# Patient Record
Sex: MALE | Race: WHITE | Employment: PART TIME | URBAN - METROPOLITAN AREA
[De-identification: names, ages, dates, MRNs, and addresses within clinical notes are randomized per-mention and may not be internally consistent; named-entity substitution may affect disease eponyms.]

---

## 2017-05-17 ENCOUNTER — HOSPITAL ENCOUNTER (EMERGENCY)
Age: 22
Discharge: HOME | End: 2017-05-17
Attending: EMERGENCY MEDICINE

## 2017-05-17 ENCOUNTER — APPOINTMENT (OUTPATIENT)
Dept: GENERAL RADIOLOGY | Age: 22
End: 2017-05-17

## 2017-05-17 VITALS
WEIGHT: 150 LBS | HEART RATE: 110 BPM | HEIGHT: 69 IN | OXYGEN SATURATION: 97 % | BODY MASS INDEX: 22.22 KG/M2 | DIASTOLIC BLOOD PRESSURE: 82 MMHG | SYSTOLIC BLOOD PRESSURE: 133 MMHG | TEMPERATURE: 98.7 F | RESPIRATION RATE: 18 BRPM

## 2017-05-17 DIAGNOSIS — F32.A DEPRESSION, UNSPECIFIED DEPRESSION TYPE: ICD-10-CM

## 2017-05-17 DIAGNOSIS — R05.9 COUGH: ICD-10-CM

## 2017-05-17 DIAGNOSIS — F41.1 ANXIETY STATE: Primary | ICD-10-CM

## 2017-05-17 DIAGNOSIS — J02.9 ACUTE PHARYNGITIS, UNSPECIFIED ETIOLOGY: ICD-10-CM

## 2017-05-17 PROCEDURE — 99284 EMERGENCY DEPT VISIT MOD MDM: CPT

## 2017-05-17 PROCEDURE — 71020 XR CHEST STANDARD TWO VW: CPT

## 2017-05-17 PROCEDURE — 6370000000 HC RX 637 (ALT 250 FOR IP): Performed by: EMERGENCY MEDICINE

## 2017-05-17 RX ORDER — BENZONATATE 100 MG/1
100 CAPSULE ORAL ONCE
Status: COMPLETED | OUTPATIENT
Start: 2017-05-17 | End: 2017-05-17

## 2017-05-17 RX ORDER — LORAZEPAM 1 MG/1
1 TABLET ORAL ONCE
Status: COMPLETED | OUTPATIENT
Start: 2017-05-17 | End: 2017-05-17

## 2017-05-17 RX ORDER — ALBUTEROL SULFATE 90 UG/1
1-2 AEROSOL, METERED RESPIRATORY (INHALATION) EVERY 4 HOURS PRN
Qty: 1 INHALER | Refills: 0 | Status: SHIPPED | OUTPATIENT
Start: 2017-05-17 | End: 2020-03-22 | Stop reason: ALTCHOICE

## 2017-05-17 RX ORDER — BENZONATATE 100 MG/1
100 CAPSULE ORAL 3 TIMES DAILY PRN
Qty: 20 CAPSULE | Refills: 0 | Status: SHIPPED | OUTPATIENT
Start: 2017-05-17 | End: 2021-03-12

## 2017-05-17 RX ADMIN — BENZONATATE 100 MG: 100 CAPSULE ORAL at 21:54

## 2017-05-17 RX ADMIN — Medication 1 LOZENGE: at 21:54

## 2017-05-17 RX ADMIN — LORAZEPAM 1 MG: 1 TABLET ORAL at 21:55

## 2017-05-17 ASSESSMENT — ENCOUNTER SYMPTOMS
BLOOD IN STOOL: 0
SORE THROAT: 1
CONSTIPATION: 0
BACK PAIN: 0
COUGH: 1
NAUSEA: 0
DIARRHEA: 0
ABDOMINAL PAIN: 0
VOMITING: 0
TROUBLE SWALLOWING: 0
RHINORRHEA: 0
SINUS PRESSURE: 0
PHOTOPHOBIA: 0
SHORTNESS OF BREATH: 0

## 2017-05-17 ASSESSMENT — PAIN SCALES - GENERAL: PAINLEVEL_OUTOF10: 0

## 2018-06-15 ENCOUNTER — HOSPITAL ENCOUNTER (EMERGENCY)
Age: 23
Discharge: HOME | End: 2018-06-15
Attending: EMERGENCY MEDICINE

## 2018-06-15 VITALS
BODY MASS INDEX: 21.47 KG/M2 | OXYGEN SATURATION: 98 % | RESPIRATION RATE: 16 BRPM | SYSTOLIC BLOOD PRESSURE: 124 MMHG | WEIGHT: 150 LBS | TEMPERATURE: 97.4 F | HEART RATE: 116 BPM | HEIGHT: 70 IN | DIASTOLIC BLOOD PRESSURE: 82 MMHG

## 2018-06-15 DIAGNOSIS — F32.A DEPRESSION, UNSPECIFIED DEPRESSION TYPE: Primary | ICD-10-CM

## 2018-06-15 PROCEDURE — 99283 EMERGENCY DEPT VISIT LOW MDM: CPT

## 2020-03-22 ENCOUNTER — HOSPITAL ENCOUNTER (EMERGENCY)
Age: 25
Discharge: HOME | End: 2020-03-23
Attending: EMERGENCY MEDICINE
Payer: COMMERCIAL

## 2020-03-22 VITALS
HEART RATE: 105 BPM | HEIGHT: 70 IN | OXYGEN SATURATION: 99 % | WEIGHT: 155 LBS | RESPIRATION RATE: 16 BRPM | SYSTOLIC BLOOD PRESSURE: 119 MMHG | DIASTOLIC BLOOD PRESSURE: 71 MMHG | TEMPERATURE: 98.3 F | BODY MASS INDEX: 22.19 KG/M2

## 2020-03-22 PROCEDURE — 99284 EMERGENCY DEPT VISIT MOD MDM: CPT

## 2020-03-22 RX ORDER — ALBUTEROL SULFATE 90 UG/1
2 AEROSOL, METERED RESPIRATORY (INHALATION) EVERY 6 HOURS PRN
Qty: 1 INHALER | Refills: 3 | Status: SHIPPED | OUTPATIENT
Start: 2020-03-22

## 2020-03-22 RX ORDER — PREDNISONE 20 MG/1
60 TABLET ORAL DAILY
Qty: 15 TABLET | Refills: 0 | Status: SHIPPED | OUTPATIENT
Start: 2020-03-22 | End: 2020-03-27

## 2020-03-22 RX ORDER — DOXYCYCLINE HYCLATE 100 MG
100 TABLET ORAL 2 TIMES DAILY
Qty: 20 TABLET | Refills: 0 | Status: SHIPPED | OUTPATIENT
Start: 2020-03-22 | End: 2020-04-01

## 2020-03-23 NOTE — ED PROVIDER NOTES
EMERGENCY DEPARTMENT ENCOUNTER    Pt Name: Kayla Mari  MRN: [id number]  [place] 1995  Date of evaluation: 3/22/20  CHIEF COMPLAINT       Chief Complaint   Patient presents with    Asthma     HISTORY OF PRESENT ILLNESS   HPI    HISTORY OF PRESENT ILLNESS:  Past medical history of anxiety and asthma presents for chief complaint of asthma exacerbation cough. Patient has had mild asthma exacerbation for the past couple days. No fevers or chills. No runny nose or sore throat. Has had mild nonproductive cough. Patient states this is consistent with his asthma exacerbation. Severity is moderate. No aggravating or relieving factors. Timing is couple days. Course is constant.   Context is asthma  -----------------------  -----------------------  REVIEW OF SYSTEMS  ED Caveat: [none]  Gen:  No fever, no chills  CV: No CP, no palpitations  Resp: +SOB, no respiratory distress  GI: No V/D, no abd pain  : No dysuria, no increased frequency  Skin: No rash, no purulent lesions  Eyes: No blurry vision, No double vision  MSK: No back pain, no joint pain  Neuro: No HA, no sensation changes  Psych: No SI/HI  -----------------------  -----------------------  ALLERGIES  -per nursing records, reviewed    PAST MEDICAL HISTORY  -See HPI    SOCIAL HISTORY  -No daily drinking, no IV drugs  -----------------------  -----------------------  PHYSICAL EXAM  Gen: Alert, no acute distress  Skin: Warm, no rashes  Head: Normocephalic, atraumatic  Neck: No midline tenderness, no nuchal rigidity  Eye: EOMI, PERRLA, normal conjunctiva  ENT: Mucous membranes moist, no pharyngeal erythema  CV: Mildly tachycardic, no rubs  Resp: Respirations unlabored, mild diffuse wheezing  GI: Soft, non distended, no large abdominal masses, non tender  MSK: No midline back pain, no large joint effusions  Neuro: Alert and oriented, no focal neurological deficits observed  Psych: Cooperative, appropriate mood and affect  -----------------------  -----------------------  MEDICAL DECISION MAKING  Differential Diagnosis:  - Consideration is given for   bronchospasm, pulmonary edema, pneumonia, pneumothorax, pericardial effusion, PE, ACS,  -  #Impression/Plan:  - Clinically patient's presentation is most consistent with anxiety and asthma exacerbation. Extensive discussion with patient he wants no further testing just a work note, albuterol refills, steroids and antibiotics if anything were to worsen. I had extensive discussion with him regarding this and did offer further testing. Patient declines at this time. Clinically is very well-appearing at this time in no acute distress.  -I did strongly consider coronavirus. However patient has no fever and is clinically well-appearing at this time. Does have a mild diffuse wheeze and a history of asthma and symptoms are consistent with his asthma. Patient did not want any further testing and would like discharge home.      -----------------------  -----------------------  Michelle Palmer MD, Heather Iniguez [id number]  Emergency Medicine Attending  Questions? Please contact my cell phone anytime. [phone]  *This charting supersedes any ED resident or staff charting and was written using speech recognition software      PASTMEDICAL HISTORY     Past Medical History:   Diagnosis Date    Anxiety     Asthma     Depression      SURGICAL HISTORY     No past surgical history on file. CURRENT MEDICATIONS       Previous Medications    ALBUTEROL SULFATE HFA (PROVENTIL HFA) 108 (90 BASE) MCG/ACT INHALER    Inhale 1-2 puffs into the lungs every 4 hours as needed for Wheezing or Shortness of Breath (Space out to every 6 hours as symptoms improve) Space out to every 6 hours as symptoms improve.     BENZOCAINE-MENTHOL (CEPACOL SORE THROAT) 15-3.6 MG LOZENGE    Take 1 lozenge by mouth 3 times daily as needed for Sore Throat    BENZONATATE (TESSALON) 100 MG CAPSULE    Take 1 capsule by mouth 3 times daily as

## 2020-03-23 NOTE — ED NOTES
Mode of arrival (squad #, walk in, police, etc) : walk in from home        Chief complaint(s): asthma        Arrival Note (brief scenario, treatment PTA, etc). : Pt presented to the ED c/o of shortness of breath X 3 days with a productive cough. Pt states he has a Hx of asthma. Pt states sometimes his cough produces a clear sputum. Pt states he is suppose to take his inhaler daily but is not compliant with it. Pt states he took his inhaler today and it helped with his breathing. Pt alert and oriented, PWD, PMS intact, lung sounds clear. C= \"Have you ever felt that you should Cut down on your drinking? \"  No  A= \"Have people Annoyed you by criticizing your drinking? \"  No  G= \"Have you ever felt bad or Guilty about your drinking? \"  No  E= \"Have you ever had a drink as an Eye-opener first thing in the morning to steady your nerves or to help a hangover? \"  No      Deferred []      Reason for deferring: N/A    *If yes to two or more: probable alcohol abuse. Jennifer Hale RN  03/22/20 0317

## 2021-03-06 ENCOUNTER — APPOINTMENT (OUTPATIENT)
Dept: GENERAL RADIOLOGY | Age: 26
End: 2021-03-06
Payer: COMMERCIAL

## 2021-03-06 ENCOUNTER — HOSPITAL ENCOUNTER (EMERGENCY)
Age: 26
Discharge: HOME | End: 2021-03-06
Attending: EMERGENCY MEDICINE
Payer: COMMERCIAL

## 2021-03-06 VITALS
RESPIRATION RATE: 11 BRPM | OXYGEN SATURATION: 99 % | DIASTOLIC BLOOD PRESSURE: 67 MMHG | SYSTOLIC BLOOD PRESSURE: 114 MMHG | TEMPERATURE: 98.3 F | HEIGHT: 69 IN | BODY MASS INDEX: 22.22 KG/M2 | HEART RATE: 70 BPM | WEIGHT: 150 LBS

## 2021-03-06 DIAGNOSIS — R00.2 PALPITATIONS: Primary | ICD-10-CM

## 2021-03-06 PROCEDURE — 71045 X-RAY EXAM CHEST 1 VIEW: CPT

## 2021-03-06 PROCEDURE — 6370000000 HC RX 637 (ALT 250 FOR IP): Performed by: GENERAL PRACTICE

## 2021-03-06 PROCEDURE — 99285 EMERGENCY DEPT VISIT HI MDM: CPT

## 2021-03-06 PROCEDURE — 93005 ELECTROCARDIOGRAM TRACING: CPT | Performed by: GENERAL PRACTICE

## 2021-03-06 RX ADMIN — Medication 1 LOZENGE: at 16:04

## 2021-03-06 ASSESSMENT — ENCOUNTER SYMPTOMS
SHORTNESS OF BREATH: 1
ABDOMINAL PAIN: 0
SORE THROAT: 1
VOMITING: 1
TROUBLE SWALLOWING: 0
NAUSEA: 0
VOICE CHANGE: 0
CONSTIPATION: 0

## 2021-03-06 NOTE — ED PROVIDER NOTES
[address]  Emergency Department Encounter  EmergencyMedicine Resident     Pt Neel Niño III  MRN: [id number]  [place] 1995  Date of evaluation: 3/6/21  PCP:  No primary care provider on file. CHIEF COMPLAINT       Chief Complaint   Patient presents with    Nausea    Chills    Palpitations       HISTORY OF PRESENT ILLNESS  (Location/Symptom, Timing/Onset, Context/Setting, Quality, Duration, Modifying Factors, Severity.)      Lukas Liriano III is a 30 y.o. male who presents with nausea palpitations and chills. Patient states that he recently quit smoking a pack and half a day and started using nicotine vapes. Patient states he has been using it quite a bit. Patient states he had one other episode when he used it where he felt his heart was racing. Patient states this morning he had \"hit it quite a bit\" and began to feel his heart race and had 2 bouts of nonbloody emesis. Patient states he called poison control and he had every symptom that they mentioned. Patient has a history of anxiety asthma and depression. PAST MEDICAL / SURGICAL / SOCIAL / FAMILY HISTORY      has a past medical history of Anxiety, Asthma, and Depression. has no past surgical history on file.     Social History     Socioeconomic History    Marital status: Single     Spouse name: Not on file    Number of children: Not on file    Years of education: Not on file    Highest education level: Not on file   Occupational History    Not on file   Social Needs    Financial resource strain: Not on file    Food insecurity     Worry: Not on file     Inability: Not on file    Transportation needs     Medical: Not on file     Non-medical: Not on file   Tobacco Use    Smoking status: Former Smoker     Packs/day: 1.50     Years: 4.00     Pack years: 6.00     Types: E-Cigarettes, Cigarettes     Quit date: 2018     Years since quittin.8    Smokeless tobacco: Never Used    Tobacco comment: pt currently using vapor   Substance and Sexual Activity    Alcohol use: Yes     Comment: social ETOH use    Drug use: Yes     Types: Marijuana     Comment: drug abuse includes cannabis    Sexual activity: Not on file   Lifestyle    Physical activity     Days per week: Not on file     Minutes per session: Not on file    Stress: Not on file   Relationships    Social connections     Talks on phone: Not on file     Gets together: Not on file     Attends Confucianist service: Not on file     Active member of club or organization: Not on file     Attends meetings of clubs or organizations: Not on file     Relationship status: Not on file    Intimate partner violence     Fear of current or ex partner: Not on file     Emotionally abused: Not on file     Physically abused: Not on file     Forced sexual activity: Not on file   Other Topics Concern    Not on file   Social History Narrative    Not on file       History reviewed. No pertinent family history. Allergies:  Patient has no known allergies. Home Medications:  Prior to Admission medications    Medication Sig Start Date End Date Taking? Authorizing Provider   albuterol sulfate HFA (VENTOLIN HFA) 108 (90 Base) MCG/ACT inhaler Inhale 2 puffs into the lungs every 6 hours as needed for Wheezing 3/22/20   Sawyer Delgado MD   benzocaine-menthol (CEPACOL SORE THROAT) 15-3.6 MG lozenge Take 1 lozenge by mouth 3 times daily as needed for Sore Throat 5/17/17   Sheba Reyes DO   benzonatate (TESSALON) 100 MG capsule Take 1 capsule by mouth 3 times daily as needed for Cough 5/17/17   Sylvester Hameed, DO       REVIEW OF SYSTEMS    (2-9 systems for level 4, 10 or more for level 5)      Review of Systems   Constitutional: Positive for activity change and chills. Negative for appetite change and fever. HENT: Positive for sore throat. Negative for trouble swallowing and voice change. Respiratory: Positive for shortness of breath.     Cardiovascular: Negative for chest pain. Gastrointestinal: Positive for vomiting. Negative for abdominal pain, constipation and nausea. Genitourinary: Negative for dysuria and hematuria. Skin: Negative for rash and wound. Neurological: Negative for seizures and headaches. Psychiatric/Behavioral: Negative for confusion. The patient is nervous/anxious. PHYSICAL EXAM   (up to 7 for level 4, 8 or more for level 5)      INITIAL VITALS:   /78   Pulse 79   Temp 98.3 °F (36.8 °C) (Oral)   Resp 18   Ht 5' 9\" (1.753 m)   Wt 150 lb (68 kg)   SpO2 99%   BMI 22.15 kg/m²     Physical Exam  Constitutional:       General: He is not in acute distress. Appearance: He is not ill-appearing, toxic-appearing or diaphoretic. Comments: Patient is sitting comfortably in stretcher, there is no evidence of cholinergic syndrome to include increased oral secretions, sweating, fasciculations   HENT:      Head: Normocephalic and atraumatic. Cardiovascular:      Rate and Rhythm: Normal rate and regular rhythm. Pulses: Normal pulses. Heart sounds: Normal heart sounds. Pulmonary:      Effort: Pulmonary effort is normal.      Breath sounds: Normal breath sounds. Abdominal:      General: Abdomen is flat. Palpations: Abdomen is soft. Neurological:      General: No focal deficit present. Mental Status: He is alert and oriented to person, place, and time. Gait: Gait normal.   Psychiatric:         Mood and Affect: Mood normal.         Behavior: Behavior normal.         Thought Content:  Thought content normal.         Judgment: Judgment normal.         DIFFERENTIAL  DIAGNOSIS     PLAN (LABS / IMAGING / EKG):  Orders Placed This Encounter   Procedures    XR CHEST PORTABLE    EKG 12 Lead       MEDICATIONS ORDERED:  Orders Placed This Encounter   Medications    benzocaine-menthol (CEPACOL SORE THROAT) lozenge 1 lozenge       DDX: Nicotine toxicity, anxiety, anticholinergic toxicity, organophosphate toxicity, sympathomimetic, serotonin syndrome    DIAGNOSTIC RESULTS / EMERGENCY DEPARTMENT COURSE / MDM   :  Results for orders placed or performed during the hospital encounter of 03/06/21   EKG 12 Lead   Result Value Ref Range    Ventricular Rate 59 BPM    Atrial Rate 59 BPM    P-R Interval 148 ms    QRS Duration 80 ms    Q-T Interval 382 ms    QTc Calculation (Bazett) 378 ms    P Axis 61 degrees    R Axis 79 degrees    T Axis 64 degrees           RADIOLOGY:  EXAMINATION:   ONE XRAY VIEW OF THE CHEST       3/6/2021 3:17 pm       COMPARISON:   17 17       HISTORY:   ORDERING SYSTEM PROVIDED HISTORY: palpitations   TECHNOLOGIST PROVIDED HISTORY:   palpitations   Reason for Exam: palpitations   Acuity: Acute   Type of Exam: Initial   Additional signs and symptoms: palpitations       FINDINGS:   No focal consolidation, pleural effusion or pneumothorax.  The   cardiomediastinal silhouette is stable.  No overt pulmonary edema.  The   osseous structures are stable.           Impression   No acute cardiopulmonary findings. EKG  EKG Interpretation    Interpreted by me    Rhythm: normal sinus   Rate: 59 bpm  Axis: normal  Ectopy: none  Conduction: Benign early repolarization  ST Segments: no acute change  T Waves: no acute change  Q Waves: none    Clinical Impression: no acute changes and normal EKG    All EKG's are interpreted by the Emergency Department Physician who either signs or Co-signs this chart in the absence of a cardiologist.    EMERGENCY DEPARTMENT COURSE/IMPRESSION: 57-year-old male present emergency department with concerns for nicotine toxicity, states he had palpitations, one bout of nonbloody emesis. No vision changes, patient has no salivation, lacrimation, increased urination, abdominal pain or myosis. Patientis afebrile, breathing comfortably room air. EKG, chest x-ray and Cepacol lozenge given for sore throat after throwing up.   Educated patient on avoiding nicotine and other stimulants may cause of symptoms. Educate patient follow-up with primary care provider in the next 3 days for reevaluation. Additional return precautions discussed. PROCEDURES:  None    CONSULTS:  None    CRITICAL CARE:  None    FINAL IMPRESSION      1.  Palpitations          DISPOSITION / PLAN     DISPOSITION Decision To Discharge 03/06/2021 03:43:49 PM      PATIENT REFERRED TO:  TATIANA JUNG [facility]  [address]  [address] [address]  In 3 days      [facility] ED  [facility]  [address] [place] [address]  [phone]    As needed, If symptoms worsen      DISCHARGE MEDICATIONS:  New Prescriptions    No medications on file       Daniel Gillis DO  Emergency Medicine Resident    (Please note that portions of thisnote were completed with a voice recognition program.  Efforts were made to edit the dictations but occasionally words are mis-transcribed.)     Daniel Gillis DO  Resident  03/06/21 4250

## 2021-03-06 NOTE — ED PROVIDER NOTES
EMERGENCY DEPARTMENT ENCOUNTER   ATTENDING ATTESTATION     Pt Name: Cici Reddy  MRN: [id number]  [place] 1995  Date of evaluation: 3/6/21       Antonieta Rasmussen III is a 30 y.o. male who presents with Nausea, Chills, and Palpitations      MDM: 47-year-old male presents for complaint of palpitations and nausea. Patient reports that he recently quit smoking and is has been using a vape pen, states he thinks the vape pen has more nicotine in it than he is used to, patient reports he has been using it a lot recently, patient states after he used it this afternoon he developed some feelings of palpitations and nausea, patient states he called poison control and was told that he was likely having reaction to the nicotine and was told to come to ED for evaluation    Initial exam patient is in no acute distress, vitals are stable, will provide symptomatic treatment obtain chest x-ray and EKG    Chest x-ray was negative for acute process EKG showing sinus bradycardia rate of 59, normal axis, normal intervals, no significant ST segment elevation or depression, no significant T wave changes, benign early repole    Results were discussed with the patient, states he is feeling better, discussed return precautions, discussed need follow-up with PCP, will provide PCP follow-up information    Patient/Guardian was informed of their diagnosis and told to follow up with PCP  in 1-3 days. Patient demonstrates understanding and agreement with the plan. They were given the opportunity to ask questions and those questions were answered to the best of our ability with the available information. Patient/Guardian told to return to the ED for any new, worsening, changing or persistent symptoms. This dictation was prepared using [employer] [facility] [employer] voice recognition software.       Vitals:   Vitals:    03/06/21 1453 03/06/21 1603   BP: 127/78 114/67   Pulse: 79 70   Resp: 18 11   Temp: 98.3 °F (36.8 °C)    TempSrc: Oral    SpO2: 99%

## 2021-03-06 NOTE — ED TRIAGE NOTES
Mode of arrival (squad #, walk in, police, etc) : Walk In        Chief complaint(s): Nausea, chills, palpitations        Arrival Note (brief scenario, treatment PTA, etc). : Pt arrives to ED c/o nausea and chills that has been ongoing for the last week. Patient states that he recently quite smoking and that is when his symptoms started. Patient states that has been using the e cigarette and he thinks he may have used too much of it. Patient states that he called poison control and was told he was having symptoms of having too much nicotine. C= \"Have you ever felt that you should Cut down on your drinking? \"  No  A= \"Have people Annoyed you by criticizing your drinking? \"  No  G= \"Have you ever felt bad or Guilty about your drinking? \"  No  E= \"Have you ever had a drink as an Eye-opener first thing in the morning to steady your nerves or to help a hangover? \"  No      Deferred []      Reason for deferring: N/A    *If yes to two or more: probable alcohol abuse. *

## 2021-03-08 LAB
EKG ATRIAL RATE: 59 BPM
EKG P AXIS: 61 DEGREES
EKG P-R INTERVAL: 148 MS
EKG Q-T INTERVAL: 382 MS
EKG QRS DURATION: 80 MS
EKG QTC CALCULATION (BAZETT): 378 MS
EKG R AXIS: 79 DEGREES
EKG T AXIS: 64 DEGREES
EKG VENTRICULAR RATE: 59 BPM

## 2021-03-12 ENCOUNTER — OFFICE VISIT (OUTPATIENT)
Dept: INTERNAL MEDICINE CLINIC | Age: 26
End: 2021-03-12
Payer: COMMERCIAL

## 2021-03-12 ENCOUNTER — HOSPITAL ENCOUNTER (OUTPATIENT)
Age: 26
Setting detail: SPECIMEN
Discharge: HOME | End: 2021-03-12
Payer: COMMERCIAL

## 2021-03-12 VITALS
WEIGHT: 149 LBS | BODY MASS INDEX: 22.07 KG/M2 | OXYGEN SATURATION: 97 % | HEART RATE: 79 BPM | TEMPERATURE: 98 F | DIASTOLIC BLOOD PRESSURE: 74 MMHG | SYSTOLIC BLOOD PRESSURE: 120 MMHG | HEIGHT: 69 IN

## 2021-03-12 DIAGNOSIS — F41.9 ANXIETY: ICD-10-CM

## 2021-03-12 DIAGNOSIS — Z76.89 ENCOUNTER TO ESTABLISH CARE: ICD-10-CM

## 2021-03-12 DIAGNOSIS — Z76.89 ENCOUNTER TO ESTABLISH CARE: Primary | ICD-10-CM

## 2021-03-12 DIAGNOSIS — K21.9 GASTROESOPHAGEAL REFLUX DISEASE, UNSPECIFIED WHETHER ESOPHAGITIS PRESENT: ICD-10-CM

## 2021-03-12 LAB
-: ABNORMAL
ABSOLUTE EOS #: 0.15 K/UL (ref 0–0.44)
ABSOLUTE IMMATURE GRANULOCYTE: <0.03 K/UL (ref 0–0.3)
ABSOLUTE LYMPH #: 2.17 K/UL (ref 1.1–3.7)
ABSOLUTE MONO #: 0.76 K/UL (ref 0.1–1.2)
ALBUMIN SERPL-MCNC: 5 G/DL (ref 3.5–5.2)
ALBUMIN/GLOBULIN RATIO: 2.1 (ref 1–2.5)
ALP BLD-CCNC: 86 U/L (ref 40–129)
ALT SERPL-CCNC: 13 U/L (ref 5–41)
AMORPHOUS: ABNORMAL
ANION GAP SERPL CALCULATED.3IONS-SCNC: 12 MMOL/L (ref 9–17)
AST SERPL-CCNC: 25 U/L
BACTERIA: ABNORMAL
BASOPHILS # BLD: 1 % (ref 0–2)
BASOPHILS ABSOLUTE: 0.07 K/UL (ref 0–0.2)
BILIRUB SERPL-MCNC: 0.68 MG/DL (ref 0.3–1.2)
BILIRUBIN URINE: NEGATIVE
BUN BLDV-MCNC: 11 MG/DL (ref 6–20)
BUN/CREAT BLD: NORMAL (ref 9–20)
CALCIUM SERPL-MCNC: 10.1 MG/DL (ref 8.6–10.4)
CASTS UA: ABNORMAL /LPF (ref 0–8)
CHLORIDE BLD-SCNC: 103 MMOL/L (ref 98–107)
CO2: 28 MMOL/L (ref 20–31)
COLOR: YELLOW
COMMENT UA: ABNORMAL
CREAT SERPL-MCNC: 1.04 MG/DL (ref 0.7–1.2)
CRYSTALS, UA: ABNORMAL /HPF
DIFFERENTIAL TYPE: NORMAL
EOSINOPHILS RELATIVE PERCENT: 2 % (ref 1–4)
EPITHELIAL CELLS UA: ABNORMAL /HPF (ref 0–5)
GFR AFRICAN AMERICAN: >60 ML/MIN
GFR NON-AFRICAN AMERICAN: >60 ML/MIN
GFR SERPL CREATININE-BSD FRML MDRD: NORMAL ML/MIN/{1.73_M2}
GFR SERPL CREATININE-BSD FRML MDRD: NORMAL ML/MIN/{1.73_M2}
GLUCOSE BLD-MCNC: 81 MG/DL (ref 70–99)
GLUCOSE URINE: NEGATIVE
HCT VFR BLD CALC: 44.1 % (ref 40.7–50.3)
HEMOGLOBIN: 14.8 G/DL (ref 13–17)
IMMATURE GRANULOCYTES: 0 %
KETONES, URINE: NEGATIVE
LEUKOCYTE ESTERASE, URINE: NEGATIVE
LYMPHOCYTES # BLD: 31 % (ref 24–43)
MCH RBC QN AUTO: 31.4 PG (ref 25.2–33.5)
MCHC RBC AUTO-ENTMCNC: 33.6 G/DL (ref 28.4–34.8)
MCV RBC AUTO: 93.6 FL (ref 82.6–102.9)
MONOCYTES # BLD: 11 % (ref 3–12)
MUCUS: ABNORMAL
NITRITE, URINE: NEGATIVE
NRBC AUTOMATED: 0 PER 100 WBC
OTHER OBSERVATIONS UA: ABNORMAL
PDW BLD-RTO: 11.9 % (ref 11.8–14.4)
PH UA: >9 (ref 5–8)
PLATELET # BLD: 208 K/UL (ref 138–453)
PLATELET ESTIMATE: NORMAL
PMV BLD AUTO: 11.5 FL (ref 8.1–13.5)
POTASSIUM SERPL-SCNC: 4.3 MMOL/L (ref 3.7–5.3)
PROTEIN UA: ABNORMAL
RBC # BLD: 4.71 M/UL (ref 4.21–5.77)
RBC # BLD: NORMAL 10*6/UL
RBC UA: ABNORMAL /HPF (ref 0–4)
RENAL EPITHELIAL, UA: ABNORMAL /HPF
SEG NEUTROPHILS: 55 % (ref 36–65)
SEGMENTED NEUTROPHILS ABSOLUTE COUNT: 3.88 K/UL (ref 1.5–8.1)
SODIUM BLD-SCNC: 143 MMOL/L (ref 135–144)
SPECIFIC GRAVITY UA: 1.02 (ref 1–1.03)
TOTAL PROTEIN: 7.4 G/DL (ref 6.4–8.3)
TRICHOMONAS: ABNORMAL
TSH SERPL DL<=0.05 MIU/L-ACNC: 0.43 MIU/L (ref 0.3–5)
TURBIDITY: ABNORMAL
URINE HGB: NEGATIVE
UROBILINOGEN, URINE: NORMAL
WBC # BLD: 7.1 K/UL (ref 3.5–11.3)
WBC # BLD: NORMAL 10*3/UL
WBC UA: ABNORMAL /HPF (ref 0–5)
YEAST: ABNORMAL

## 2021-03-12 PROCEDURE — 99203 OFFICE O/P NEW LOW 30 MIN: CPT | Performed by: NURSE PRACTITIONER

## 2021-03-12 RX ORDER — BUSPIRONE HYDROCHLORIDE 7.5 MG/1
1 TABLET ORAL 2 TIMES DAILY
COMMUNITY
Start: 2021-03-10 | End: 2021-03-12 | Stop reason: SDUPTHER

## 2021-03-12 RX ORDER — BUSPIRONE HYDROCHLORIDE 7.5 MG/1
7.5 TABLET ORAL 2 TIMES DAILY
Qty: 90 TABLET | Refills: 0 | Status: SHIPPED | OUTPATIENT
Start: 2021-03-12

## 2021-03-12 RX ORDER — HYDROXYZINE HYDROCHLORIDE 25 MG/1
1 TABLET, FILM COATED ORAL PRN
COMMUNITY
Start: 2021-03-09 | End: 2021-03-12

## 2021-03-12 RX ORDER — FAMOTIDINE 20 MG/1
20 TABLET, FILM COATED ORAL 2 TIMES DAILY
Qty: 60 TABLET | Refills: 3 | Status: SHIPPED | OUTPATIENT
Start: 2021-03-12

## 2021-03-12 RX ORDER — HYDROXYZINE HYDROCHLORIDE 25 MG/1
25 TABLET, FILM COATED ORAL EVERY 4 HOURS PRN
Qty: 120 TABLET | Refills: 0 | Status: SHIPPED | OUTPATIENT
Start: 2021-03-12 | End: 2021-04-01

## 2021-03-12 ASSESSMENT — PATIENT HEALTH QUESTIONNAIRE - PHQ9
SUM OF ALL RESPONSES TO PHQ QUESTIONS 1-9: 0
1. LITTLE INTEREST OR PLEASURE IN DOING THINGS: 0
2. FEELING DOWN, DEPRESSED OR HOPELESS: 0
SUM OF ALL RESPONSES TO PHQ QUESTIONS 1-9: 0

## 2021-03-12 NOTE — PROGRESS NOTES
Visit Information    Have you changed or started any medications since your last visit including any over-the-counter medicines, vitamins, or herbal medicines? no   Are you having any side effects from any of your medications? -  no  Have you stopped taking any of your medications? Is so, why? -  no    Have you seen any other physician or provider since your last visit? Yes - Records Obtained  Have you had any other diagnostic tests since your last visit? Yes - Records Obtained  Have you been seen in the emergency room and/or had an admission to a hospital since we last saw you? Yes - Records Obtained  Have you had your routine dental cleaning in the past 6 months? no    Have you activated your [employer] account? If not, what are your barriers? No: pending       Patient Care Team:  MADISON Pierson CNP as PCP - General (Nurse Practitioner)  MADISON Pierson CNP as PCP - [facility]    Medical History Review  Past Medical, Family, and Social History reviewed and does not contribute to the patient presenting condition    Health Maintenance   Topic Date Due    Hepatitis C screen  Never done    Varicella vaccine (1 of 2 - 2-dose childhood series) Never done    HIV screen  Never done    COVID-19 Vaccine (1) Never done    Flu vaccine (1) Never done    DTaP/Tdap/Td vaccine (2 - Td) 07/28/2027    Hepatitis A vaccine  Aged Out    Hepatitis B vaccine  Aged Out    Hib vaccine  Aged Out    Meningococcal (ACWY) vaccine  Aged Out    Pneumococcal 0-64 years Vaccine  Aged Out         [address] Patient Note/History and Physical    Date of patient's visit: 3/25/2021    Name:  Steph Sin III      YOB: 1995    Patient Care Team:  MADISON Pierson CNP as PCP - General (Nurse Practitioner)  MADISON Pierson CNP as PCP - [facility]    REASON FOR VISIT:   Establish care.      HISTORY OF PRESENTING ILLNESS:    History was obtained from the patient. Cici Reddy is a 30 y.o. male here to establish care. Patient has not seen PCP in years. Patient reports past medical history as below. Patient positive history of tobacco use. Quit smoking 2 weeks ago. Cigarettes 1ppd day x 7 years. Drinks rarely. Patient is single, no children. Works at [employer]. Recently seen in the ED for chest pain and palpitations; EKG NSR and CXR negative. Patient endorses going to [facility] a couple days later for anxiety attack. Diaphoretic, palpitations, shortness of breath. PAST MEDICAL HISTORY:          Diagnosis Date    Anxiety     Asthma     Depression        PAST SURGICAL HISTORY:    No past surgical history on file. ALLERGIES:    No Known Allergies      MEDICATION:      Current Outpatient Medications on File Prior to Visit   Medication Sig Dispense Refill    albuterol sulfate HFA (VENTOLIN HFA) 108 (90 Base) MCG/ACT inhaler Inhale 2 puffs into the lungs every 6 hours as needed for Wheezing (Patient not taking: Reported on 3/12/2021) 1 Inhaler 3     No current facility-administered medications on file prior to visit. FAMILY HISTORY:    No family history on file.     SOCIAL HISTORY:      Social History     Socioeconomic History    Marital status: Single     Spouse name: None    Number of children: None    Years of education: None    Highest education level: None   Occupational History    None   Social Needs    Financial resource strain: None    Food insecurity     Worry: None     Inability: None    Transportation needs     Medical: None     Non-medical: None   Tobacco Use    Smoking status: Former Smoker     Packs/day: 1.50     Years: 4.00     Pack years: 6.00     Types: E-Cigarettes, Cigarettes     Quit date: 2018     Years since quittin.9    Smokeless tobacco: Never Used    Tobacco comment: pt currently using vapor   Substance and Sexual Activity    Alcohol use: Yes     Comment: social ETOH use  Drug use: Yes     Types: Marijuana     Comment: drug abuse includes cannabis    Sexual activity: None   Lifestyle    Physical activity     Days per week: None     Minutes per session: None    Stress: None   Relationships    Social connections     Talks on phone: None     Gets together: None     Attends Zoroastrian service: None     Active member of club or organization: None     Attends meetings of clubs or organizations: None     Relationship status: None    Intimate partner violence     Fear of current or ex partner: None     Emotionally abused: None     Physically abused: None     Forced sexual activity: None   Other Topics Concern    None   Social History Narrative    None         REVIEW OF SYSTEMS:   Review of Systems   Constitutional: Negative for appetite change, diaphoresis, fatigue, fever and unexpected weight change. HENT: Negative for ear pain, postnasal drip, rhinorrhea, sinus pain, sore throat and trouble swallowing. Eyes: Negative for visual disturbance. Respiratory: Negative for cough, chest tightness, shortness of breath and wheezing. Cardiovascular: Negative for chest pain, palpitations and leg swelling. Gastrointestinal: Negative for abdominal pain, blood in stool, constipation, diarrhea, nausea and vomiting. Endocrine: Negative for polydipsia, polyphagia and polyuria. Genitourinary: Negative for difficulty urinating, dysuria and flank pain. Musculoskeletal: Negative for arthralgias and myalgias. Skin: Negative for rash and wound. Neurological: Negative for dizziness, syncope and weakness. Psychiatric/Behavioral: The patient is nervous/anxious. All other systems reviewed and are negative. PHYSICAL EXAM:      Vitals:    03/12/21 1255   BP: 120/74   Pulse: 79   Temp: 98 °F (36.7 °C)   SpO2: 97%   Weight: 149 lb (67.6 kg)   Height: 5' 9\" (1.753 m)       Physical Exam  Vitals signs reviewed. Constitutional:       Appearance: Normal appearance.    HENT: Head: Normocephalic. Cardiovascular:      Rate and Rhythm: Normal rate and regular rhythm. Pulses: Normal pulses. Heart sounds: Normal heart sounds. Pulmonary:      Effort: Pulmonary effort is normal.      Breath sounds: Normal breath sounds. Abdominal:      General: Bowel sounds are normal.      Palpations: Abdomen is soft. Musculoskeletal: Normal range of motion. General: No swelling, tenderness or deformity. Skin:     General: Skin is warm and dry. Neurological:      General: No focal deficit present. Mental Status: He is alert and oriented to person, place, and time. Psychiatric:         Mood and Affect: Mood normal.         Behavior: Behavior normal.         Thought Content: Thought content normal.         Judgment: Judgment normal.          LABORATORY FINDINGS:    CBC:   Lab Results   Component Value Date    WBC 7.1 03/12/2021    HGB 14.8 03/12/2021     03/12/2021     BMP:    Lab Results   Component Value Date     03/12/2021    K 4.3 03/12/2021     03/12/2021    CO2 28 03/12/2021    BUN 11 03/12/2021    CREATININE 1.04 03/12/2021    GLUCOSE 81 03/12/2021     Hemoglobin A1C: No results found for: LABA1C  Lipid profile: No results found for: CHOL, TRIG, HDL  Thyroid functions:   Lab Results   Component Value Date    TSH 0.43 03/12/2021      Hepatic functions:   Lab Results   Component Value Date    ALT 13 03/12/2021    AST 25 03/12/2021    PROT 7.4 03/12/2021    BILITOT 0.68 03/12/2021    LABALBU 5.0 03/12/2021       ASSESSMENT AND PLAN:     1. Encounter to establish care  - CBC With Auto Differential; Future  - Comprehensive Metabolic Panel; Future  - Lipid Panel; Future  - TSH without Reflex; Future  - Urinalysis; Future    2. Anxiety  - hydrOXYzine (ATARAX) 25 MG tablet; Take 1 tablet by mouth every 4 hours as needed for Anxiety  Dispense: 120 tablet; Refill: 0  - busPIRone (BUSPAR) 7.5 MG tablet;  Take 1 tablet by mouth 2 times daily  Dispense: 90 tablet; Refill: 0    3. Gastroesophageal reflux disease, unspecified whether esophagitis present  - famotidine (PEPCID) 20 MG tablet; Take 1 tablet by mouth 2 times daily  Dispense: 60 tablet; Refill: 3      INSTRUCTIONS:   Return in about 4 weeks (around 4/9/2021). Robert Cowart received counseling onthe following healthy behaviors: nutrition, exercise and medication adherence    Reviewed prior labs and healthmaintenance. Discussed use, benefit, and side effects of prescribed medications. Barriers tomedication compliance addressed. All patient questions answered. Patient voiced understanding. Patient given educational materials - see patient instructions    MADISON Hdz - CNP   [facility]  3/25/2021, 1:06 PM    Please note that this chart was generated using voice recognition Dragon dictation software. Although every effort was made to ensure the accuracy of this automatedtranscription, some errors in transcription may have occurred.

## 2021-03-25 ASSESSMENT — ENCOUNTER SYMPTOMS
TROUBLE SWALLOWING: 0
CONSTIPATION: 0
NAUSEA: 0
WHEEZING: 0
VOMITING: 0
SINUS PAIN: 0
RHINORRHEA: 0
SHORTNESS OF BREATH: 0
BLOOD IN STOOL: 0
DIARRHEA: 0
SORE THROAT: 0
COUGH: 0
CHEST TIGHTNESS: 0
ABDOMINAL PAIN: 0

## 2021-03-26 DIAGNOSIS — R82.90 ABNORMAL URINALYSIS: Primary | ICD-10-CM

## 2021-04-09 ENCOUNTER — OFFICE VISIT (OUTPATIENT)
Dept: INTERNAL MEDICINE CLINIC | Age: 26
End: 2021-04-09
Payer: COMMERCIAL

## 2021-04-09 VITALS
TEMPERATURE: 97.9 F | SYSTOLIC BLOOD PRESSURE: 120 MMHG | DIASTOLIC BLOOD PRESSURE: 80 MMHG | RESPIRATION RATE: 16 BRPM | BODY MASS INDEX: 22.07 KG/M2 | WEIGHT: 149 LBS | HEART RATE: 80 BPM | HEIGHT: 69 IN

## 2021-04-09 DIAGNOSIS — K21.9 GASTROESOPHAGEAL REFLUX DISEASE, UNSPECIFIED WHETHER ESOPHAGITIS PRESENT: ICD-10-CM

## 2021-04-09 DIAGNOSIS — J30.2 SEASONAL ALLERGIES: ICD-10-CM

## 2021-04-09 DIAGNOSIS — F41.9 ANXIETY: Primary | ICD-10-CM

## 2021-04-09 PROCEDURE — 99213 OFFICE O/P EST LOW 20 MIN: CPT | Performed by: NURSE PRACTITIONER

## 2021-04-09 RX ORDER — CETIRIZINE HYDROCHLORIDE 10 MG/1
10 TABLET ORAL DAILY
Qty: 30 TABLET | Refills: 0 | Status: SHIPPED | OUTPATIENT
Start: 2021-04-09 | End: 2021-05-09

## 2021-04-09 RX ORDER — FLUOXETINE HYDROCHLORIDE 20 MG/1
1 CAPSULE ORAL DAILY
COMMUNITY
Start: 2021-03-16

## 2021-04-09 ASSESSMENT — ENCOUNTER SYMPTOMS
NAUSEA: 0
VOMITING: 0
RHINORRHEA: 0
COUGH: 1
TROUBLE SWALLOWING: 0
BLOOD IN STOOL: 0
CHEST TIGHTNESS: 0
SINUS PAIN: 0
CONSTIPATION: 0
SORE THROAT: 0
SHORTNESS OF BREATH: 0
ABDOMINAL PAIN: 0
WHEEZING: 0
DIARRHEA: 0

## 2021-04-09 ASSESSMENT — PATIENT HEALTH QUESTIONNAIRE - PHQ9
SUM OF ALL RESPONSES TO PHQ QUESTIONS 1-9: 0
2. FEELING DOWN, DEPRESSED OR HOPELESS: 0
SUM OF ALL RESPONSES TO PHQ QUESTIONS 1-9: 0
SUM OF ALL RESPONSES TO PHQ9 QUESTIONS 1 & 2: 0

## 2021-04-09 NOTE — PROGRESS NOTES
appearance. HENT:      Head: Normocephalic. Cardiovascular:      Rate and Rhythm: Normal rate and regular rhythm. Pulses: Normal pulses. Heart sounds: Normal heart sounds. Pulmonary:      Effort: Pulmonary effort is normal.      Breath sounds: Normal breath sounds. Abdominal:      General: Bowel sounds are normal.      Palpations: Abdomen is soft. Musculoskeletal: Normal range of motion. General: No swelling, tenderness or deformity. Skin:     General: Skin is warm and dry. Neurological:      General: No focal deficit present. Mental Status: He is alert and oriented to person, place, and time. Psychiatric:         Mood and Affect: Mood normal.         Behavior: Behavior normal.         Thought Content: Thought content normal.         Judgment: Judgment normal.          LABORATORY FINDINGS:    CBC:  Lab Results   Component Value Date    WBC 7.1 03/12/2021    HGB 14.8 03/12/2021     03/12/2021       BMP:    Lab Results   Component Value Date     03/12/2021    K 4.3 03/12/2021     03/12/2021    CO2 28 03/12/2021    BUN 11 03/12/2021    CREATININE 1.04 03/12/2021    GLUCOSE 81 03/12/2021       HEMOGLOBIN A1C: No results found for: LABA1C    FASTING LIPID PANEL:No results found for: CHOL, HDL, TRIG    ASSESSMENT AND PLAN:      1. Anxiety  - well controlled per patient  - compliant with Prozac and Buspar  - follows with psychiatrist    2. Gastroesophageal reflux disease, unspecified whether esophagitis present  - well controlled with the use of Pepcid    3. Seasonal allergies  - cetirizine (ZYRTEC) 10 MG tablet; Take 1 tablet by mouth daily  Dispense: 30 tablet; Refill: 0      FOLLOW UP AND INSTRUCTIONS:   Return if symptoms worsen or fail to improve. Coleen Gasca received counseling on the following healthy behaviors: nutrition, exercise and medication adherence    Discussed use, benefit, and side effects of prescribed medications.   Barriers to medication compliance addressed. All patient questions answered. Patient voiced understanding. Patient given educational materials - see patient instructions    MADISON Arshad - CNP   [facility]  4/9/2021, 10:14 AM    Please note that this chart was generated using voice recognition Dragon dictation software. Although everyeffort was made to ensure the accuracy of this automated transcription, some errors in transcription may have occurred.

## 2021-07-21 ENCOUNTER — TELEPHONE (OUTPATIENT)
Dept: INTERNAL MEDICINE CLINIC | Age: 26
End: 2021-07-21

## 2021-07-21 NOTE — TELEPHONE ENCOUNTER
----- Message from Abelardo Tony sent at 7/21/2021  2:56 PM EDT -----  Subject: Appointment Request    Reason for Call: Routine (Patient Request) No Script    QUESTIONS  Type of Appointment? Established Patient  Reason for appointment request? No appointments available during search  Additional Information for Provider? patient has an appointment scheduled   for 07/27/2021 for leg pain but has been experiencing Reflux like   symptoms. feels like he needs to clear throat constantly and throat is   blocked and drainage in the back of throat. , hard to sleep flat on   back-having to sleep at an angle, not able to eat much. Would like to see   if there are any cancellations or can be seen between 07/22-07/23/2021. Please contact patient to advise  ---------------------------------------------------------------------------  --------------  CALL BACK INFO  What is the best way for the office to contact you? OK to leave message on   voicemail  Preferred Call Back Phone Number? [phone]  ---------------------------------------------------------------------------  --------------  SCRIPT ANSWERS  Relationship to Patient? Self  Have your symptoms changed? Yes  Appointment reason? Symptomatic  Select script based on patient symptoms? Adult No Script  (Is the patient requesting to see the provider for a procedure?)? No  (Is the patient requesting to see the provider urgently  today or   tomorrow. )? No  Have you been diagnosed with, awaiting test results for, or told that you   are suspected of having COVID-19 (Coronavirus)? (If patient has tested   negative or was tested as a requirement for work, school, or travel and   not based on symptoms, answer no)? No  Do you currently have flu-like symptoms including fever or chills, cough,   shortness of breath, difficulty breathing, or new loss of taste or smell? No  Have you had close contact with someone with COVID-19 in the last 14 days?    No  (Service Expert  click yes below to proceed with [employer]) As Usual   Scheduling)?  Yes

## 2021-07-23 ENCOUNTER — OFFICE VISIT (OUTPATIENT)
Dept: INTERNAL MEDICINE CLINIC | Age: 26
End: 2021-07-23

## 2021-07-23 VITALS
HEIGHT: 69 IN | BODY MASS INDEX: 23.55 KG/M2 | HEART RATE: 110 BPM | WEIGHT: 159 LBS | SYSTOLIC BLOOD PRESSURE: 118 MMHG | DIASTOLIC BLOOD PRESSURE: 80 MMHG | OXYGEN SATURATION: 97 %

## 2021-07-23 DIAGNOSIS — G25.81 RESTLESS LEG SYNDROME: Primary | ICD-10-CM

## 2021-07-23 DIAGNOSIS — F41.9 ANXIETY: ICD-10-CM

## 2021-07-23 DIAGNOSIS — R09.82 POST-NASAL DRIP: ICD-10-CM

## 2021-07-23 DIAGNOSIS — K21.9 GASTROESOPHAGEAL REFLUX DISEASE, UNSPECIFIED WHETHER ESOPHAGITIS PRESENT: ICD-10-CM

## 2021-07-23 PROCEDURE — 99214 OFFICE O/P EST MOD 30 MIN: CPT | Performed by: INTERNAL MEDICINE

## 2021-07-23 RX ORDER — ROPINIROLE 1 MG/1
1 TABLET, FILM COATED ORAL EVERY EVENING
Qty: 30 TABLET | Refills: 1 | Status: SHIPPED | OUTPATIENT
Start: 2021-07-23

## 2021-07-23 RX ORDER — FLUTICASONE PROPIONATE 50 MCG
1 SPRAY, SUSPENSION (ML) NASAL DAILY
Qty: 2 BOTTLE | Refills: 1 | Status: SHIPPED | OUTPATIENT
Start: 2021-07-23

## 2021-07-23 ASSESSMENT — ENCOUNTER SYMPTOMS
BLOOD IN STOOL: 0
WHEEZING: 0
DIARRHEA: 0
COLOR CHANGE: 0
EYE PAIN: 0
ABDOMINAL DISTENTION: 0
TROUBLE SWALLOWING: 0
SHORTNESS OF BREATH: 0
EYE DISCHARGE: 0
COUGH: 0

## 2021-07-23 NOTE — PROGRESS NOTES
[address] [address]  Dept: [phone]  Dept Fax: [phone]    Silvia Espitia III is a 27 y.o. male who presents today for his medical conditions/complaintsas noted below. Silvia Espitia III is c/o of   Chief Complaint   Patient presents with    Leg Pain     both legs, restless leg    Gastroesophageal Reflux    Anxiety         HPI:     Restless leg  Post nasal drip        No results found for: LABA1C          ( goal A1Cis < 7)   No results found for: LABMICR  No results found for: LDLCHOLESTEROL, LDLCALC    (goal LDL is <100)   AST (U/L)   Date Value   03/12/2021 25     ALT (U/L)   Date Value   03/12/2021 13     BUN (mg/dL)   Date Value   03/12/2021 11     BP Readings from Last 3 Encounters:   07/23/21 118/80   04/09/21 120/80   03/12/21 120/74          (goal 120/80)    Past Medical History:   Diagnosis Date    Anxiety     Asthma     Depression       No past surgical history on file. No family history on file.     Social History     Tobacco Use    Smoking status: Current Every Day Smoker     Packs/day: 1.50     Years: 4.00     Pack years: 6.00     Types: Cigarettes     Last attempt to quit: 5/1/2018     Years since quitting: 3.2    Smokeless tobacco: Never Used    Tobacco comment: pt currently using vapor   Substance Use Topics    Alcohol use: Yes     Comment: social ETOH use      Current Outpatient Medications   Medication Sig Dispense Refill    fluticasone (FLONASE) 50 MCG/ACT nasal spray 1 spray by Each Nostril route daily 2 Bottle 1    rOPINIRole (REQUIP) 1 MG tablet Take 1 tablet by mouth every evening 30 tablet 1    FLUoxetine (PROZAC) 20 MG capsule Take 1 capsule by mouth daily      busPIRone (BUSPAR) 7.5 MG tablet Take 1 tablet by mouth 2 times daily 90 tablet 0    famotidine (PEPCID) 20 MG tablet Take 1 tablet by mouth 2 times daily 60 tablet 3    albuterol sulfate HFA (VENTOLIN HFA) 108 (90 Base) MCG/ACT inhaler Inhale 2 puffs into the lungs every 6 hours as needed for Wheezing 1 Inhaler 3     No current facility-administered medications for this visit. No Known Allergies    Health Maintenance   Topic Date Due    Hepatitis C screen  Never done    Varicella vaccine (1 of 2 - 2-dose childhood series) Never done    COVID-19 Vaccine (1) Never done    HIV screen  Never done    Flu vaccine (1) 09/01/2021    DTaP/Tdap/Td vaccine (2 - Td or Tdap) 07/28/2027    Hepatitis A vaccine  Aged Out    Hepatitis B vaccine  Aged Out    Hib vaccine  Aged Out    Meningococcal (ACWY) vaccine  Aged Out    Pneumococcal 0-64 years Vaccine  Aged Out       Subjective:     Review of Systems   Constitutional: Negative for appetite change, diaphoresis and fatigue. HENT: Negative for ear discharge and trouble swallowing. Post nasal drip     Eyes: Negative for pain and discharge. Respiratory: Negative for cough, shortness of breath and wheezing. Cardiovascular: Negative for chest pain and palpitations. Gastrointestinal: Negative for abdominal distention, blood in stool and diarrhea. Endocrine: Negative for polydipsia and polyphagia. Genitourinary: Negative for difficulty urinating and frequency. Musculoskeletal: Negative for gait problem, myalgias and neck pain. Skin: Negative for color change and rash. Allergic/Immunologic: Negative for environmental allergies and food allergies. Neurological: Negative for dizziness and headaches. Hematological: Negative for adenopathy. Does not bruise/bleed easily. Psychiatric/Behavioral: Negative for behavioral problems and sleep disturbance. Objective:     Physical Exam  Constitutional:       Appearance: He is well-developed. He is not diaphoretic. HENT:      Head: Normocephalic and atraumatic. Eyes:      General:         Right eye: No discharge. Left eye: No discharge. Extraocular Movements:      Right eye: Normal extraocular motion.       Left eye: Normal extraocular motion. Conjunctiva/sclera: Conjunctivae normal.      Right eye: Right conjunctiva is not injected. Left eye: Left conjunctiva is not injected. Neck:      Thyroid: No thyroid mass or thyromegaly. Vascular: No JVD. Cardiovascular:      Rate and Rhythm: Normal rate and regular rhythm. Heart sounds: No murmur heard. No friction rub. Pulmonary:      Effort: Pulmonary effort is normal. No tachypnea, bradypnea, accessory muscle usage or respiratory distress. Breath sounds: Normal breath sounds. No wheezing or rales. Abdominal:      General: Bowel sounds are normal. There is no distension. Palpations: Abdomen is soft. Tenderness: There is no abdominal tenderness. There is no rebound. Musculoskeletal:         General: No tenderness. Normal range of motion. Cervical back: Normal range of motion and neck supple. No edema or erythema. Lymphadenopathy:      Head:      Right side of head: No submental or submandibular adenopathy. Left side of head: No submental or submandibular adenopathy. Cervical: No cervical adenopathy. Skin:     General: Skin is warm. Coloration: Skin is not pale. Findings: No bruising, ecchymosis or rash. Neurological:      Mental Status: He is alert and oriented to person, place, and time. Cranial Nerves: No cranial nerve deficit. Sensory: No sensory deficit. Motor: No atrophy or abnormal muscle tone. Coordination: Coordination normal.   Psychiatric:         Mood and Affect: Mood is not anxious. Affect is not angry. Speech: Speech is not slurred. Behavior: Behavior normal. Behavior is not aggressive. Thought Content: Thought content does not include homicidal ideation. Cognition and Memory: Memory is not impaired. /80   Pulse 110   Ht 5' 9\" (1.753 m)   Wt 159 lb (72.1 kg)   SpO2 97%   BMI 23.48 kg/m²     Assessment:       Diagnosis Orders   1. Restless leg syndrome     2. Post-nasal drip     3. Gastroesophageal reflux disease, unspecified whether esophagitis present     4. Anxiety               Plan:      No follow-ups on file. No orders of the defined types were placed in this encounter. Orders Placed This Encounter   Medications    fluticasone (FLONASE) 50 MCG/ACT nasal spray     Si spray by Each Nostril route daily     Dispense:  2 Bottle     Refill:  1    rOPINIRole (REQUIP) 1 MG tablet     Sig: Take 1 tablet by mouth every evening     Dispense:  30 tablet     Refill:  1    sever anxiety issues  Seen pysch  reccommended covid vaccine  restles legs labs rev  Post nasal drip  Smoker  advised to stop smoking     Patient given educational materials - see patient instructions. Discussed use, benefit, and side effects of prescribed medications. All patientquestions answered. Pt voiced understanding. Reviewed health maintenance. Instructedto continue current medications, diet and exercise. Patient agreed with treatmentplan. Follow up as directed.      Electronically signed by Patty Pettit MD on 2021 at 3:25 PM

## 2021-07-23 NOTE — PROGRESS NOTES
Visit Information    Have you changed or started any medications since your last visit including any over-the-counter medicines, vitamins, or herbal medicines? no   Are you having any side effects from any of your medications? -  no  Have you stopped taking any of your medications? Is so, why? -  no    Have you seen any other physician or provider since your last visit? No  Have you had any other diagnostic tests since your last visit? No  Have you been seen in the emergency room and/or had an admission to a hospital since we last saw you? No  Have you had your routine dental cleaning in the past 6 months? no    Have you activated your [employer] account? If not, what are your barriers?  Yes     Patient Care Team:  MADISON Henry CNP as PCP - General (Nurse Practitioner)  [facility]ADISON [facility]    Medical History Review  Past Medical, Family, and Social History reviewed and does contribute to the patient presenting condition    Health Maintenance   Topic Date Due    Hepatitis C screen  Never done    Varicella vaccine (1 of 2 - 2-dose childhood series) Never done    COVID-19 Vaccine (1) Never done    HIV screen  Never done    Flu vaccine (1) 09/01/2021    DTaP/Tdap/Td vaccine (2 - Td or Tdap) 07/28/2027    Hepatitis A vaccine  Aged Out    Hepatitis B vaccine  Aged Out    Hib vaccine  Aged Out    Meningococcal (ACWY) vaccine  Aged Out    Pneumococcal 0-64 years Vaccine  Aged Out

## 2021-09-21 ENCOUNTER — TELEPHONE (OUTPATIENT)
Dept: INTERNAL MEDICINE CLINIC | Age: 26
End: 2021-09-21

## 2021-09-21 NOTE — LETTER
Juan Edwards III         [address] [facility] [address] [place] [address]          September 21st, 2021     Dear Juan Edwards III:     You recently missed a scheduled appointment with Zandra Hernandez CNP on 9/21/21. This is the 1st scheduledappointment that you have missed within the last twelve months. If you miss 2 more appointment, you may be dismissed from the practice. It is your responsibility to arrive to your appointment. Please call our office as soon as possible so that we may reschedule your appointment, because your health and follow-up medical care are important to us. For future appointments that you are unable to keep, please call the office at [phone] at least 24 hours in advance to cancel and reschedule. If a traditional appointment is difficult for you to make, please keep in mind, we offer E-Visits for several diagnoses as well as virtual visits. We also have primary care walk-in clinics available. For more information on these options, please contact our office.    Sincerely,        [address] [facility]

## 2021-10-21 ENCOUNTER — TELEMEDICINE (OUTPATIENT)
Dept: INTERNAL MEDICINE CLINIC | Age: 26
End: 2021-10-21

## 2021-10-21 DIAGNOSIS — J06.9 VIRAL URI WITH COUGH: Primary | ICD-10-CM

## 2021-10-21 PROCEDURE — 99213 OFFICE O/P EST LOW 20 MIN: CPT | Performed by: NURSE PRACTITIONER

## 2021-10-21 RX ORDER — COVID-19 TEST SPECIMEN COLLECT
MISCELLANEOUS MISCELLANEOUS
COMMUNITY
Start: 2021-10-03

## 2021-10-21 RX ORDER — METHYLPREDNISOLONE 4 MG/1
TABLET ORAL
Qty: 1 KIT | Refills: 0 | Status: SHIPPED | OUTPATIENT
Start: 2021-10-21 | End: 2021-10-27

## 2021-10-21 RX ORDER — GUAIFENESIN 600 MG/1
600 TABLET, EXTENDED RELEASE ORAL 2 TIMES DAILY
Qty: 30 TABLET | Refills: 0 | Status: SHIPPED | OUTPATIENT
Start: 2021-10-21 | End: 2021-11-05

## 2021-10-21 RX ORDER — BUDESONIDE AND FORMOTEROL FUMARATE DIHYDRATE 160; 4.5 UG/1; UG/1
2 AEROSOL RESPIRATORY (INHALATION) 2 TIMES DAILY
Qty: 30.6 G | Refills: 1 | Status: SHIPPED | OUTPATIENT
Start: 2021-10-21

## 2021-10-21 RX ORDER — HYDROXYZINE PAMOATE 50 MG/1
CAPSULE ORAL
COMMUNITY
Start: 2021-07-21

## 2021-10-21 SDOH — ECONOMIC STABILITY: FOOD INSECURITY: WITHIN THE PAST 12 MONTHS, THE FOOD YOU BOUGHT JUST DIDN'T LAST AND YOU DIDN'T HAVE MONEY TO GET MORE.: NEVER TRUE

## 2021-10-21 SDOH — ECONOMIC STABILITY: FOOD INSECURITY: WITHIN THE PAST 12 MONTHS, YOU WORRIED THAT YOUR FOOD WOULD RUN OUT BEFORE YOU GOT MONEY TO BUY MORE.: NEVER TRUE

## 2021-10-21 ASSESSMENT — ENCOUNTER SYMPTOMS
RHINORRHEA: 1
VOMITING: 0
SINUS PAIN: 0
COUGH: 1
SORE THROAT: 1
ABDOMINAL PAIN: 1
TROUBLE SWALLOWING: 0
NAUSEA: 0
WHEEZING: 0
SHORTNESS OF BREATH: 1
CHEST TIGHTNESS: 1
DIARRHEA: 0

## 2021-10-21 ASSESSMENT — SOCIAL DETERMINANTS OF HEALTH (SDOH): HOW HARD IS IT FOR YOU TO PAY FOR THE VERY BASICS LIKE FOOD, HOUSING, MEDICAL CARE, AND HEATING?: NOT HARD AT ALL

## 2021-10-21 NOTE — PROGRESS NOTES
Visit Information    Have you changed or started any medications since your last visit including any over-the-counter medicines, vitamins, or herbal medicines? no   Are you having any side effects from any of your medications? -  no  Have you stopped taking any of your medications? Is so, why? -  no    Have you seen any other physician or provider since your last visit? No  Have you had any other diagnostic tests since your last visit? No  Have you been seen in the emergency room and/or had an admission to a hospital since we last saw you? No  Have you had your routine dental cleaning in the past 6 months? no    Have you activated your Layer account? If not, what are your barriers?  Yes     Patient Care Team:  MADISON Amos CNP as PCP - General (Nurse Practitioner)  MADISON Amos CNP as PCP [facility]    Medical History Review  Past Medical, Family, and Social History reviewed and does contribute to the patient presenting condition    Health Maintenance   Topic Date Due    Hepatitis C screen  Never done    Varicella vaccine (1 of 2 - 2-dose childhood series) Never done    Pneumococcal 0-64 years Vaccine (1 of 2 - PPSV23) Never done    COVID-19 Vaccine (1) Never done    HIV screen  Never done    Flu vaccine (1) Never done    DTaP/Tdap/Td vaccine (2 - Td or Tdap) 07/28/2027    Hepatitis A vaccine  Aged Out    Hepatitis B vaccine  Aged Out    Hib vaccine  Aged Out    Meningococcal (ACWY) vaccine  Aged Out         [address]  [place] [address]  Dept: [phone]  Dept Fax: [phone]    Virtual Visit Progress Note  Date of patient's visit: 11/1/2021  Patient's Name:  Shayna Pride III YOB: 1995            Patient Care Team:  MADISON Amos CNP as PCP - General (Nurse Practitioner)  MADISON Amso CNP as PCP - [facility] Provider    REASON FOR VISIT:  Same day visit    HISTORY OF PRESENT ILLNESS:      Chief Complaint   Patient presents with    URI    Asthma       History was obtained from the patient. Ezra Meek is a 27 y.o. male here today virtually for URI symptoms x 1 week. Previously tested for COVID, test was negative. Associated symptoms include cough, post nasal drip, rhinorrhea, sore throat and trouble breathing. Albuterol nebulizer alleviates symptoms. Reports albuterol nebulizer was ordered by previous pcp. Patient Active Problem List   Diagnosis    Anxiety    Gastroesophageal reflux disease    Post-nasal drip    Restless leg syndrome       MEDICATIONS:      Current Outpatient Medications   Medication Sig Dispense Refill    COVID-19 Specimen Collection KIT TEST AS DIRECTED      hydrOXYzine (VISTARIL) 50 MG capsule TAKE 1 CAPSULE BY MOUTH THREE TIMES A DAY AS NEEDED      budesonide-formoterol (SYMBICORT) 160-4.5 MCG/ACT AERO Inhale 2 puffs into the lungs 2 times daily 30.6 g 1    guaiFENesin (MUCINEX) 600 MG extended release tablet Take 1 tablet by mouth 2 times daily for 15 days 30 tablet 0    fluticasone (FLONASE) 50 MCG/ACT nasal spray 1 spray by Each Nostril route daily 2 Bottle 1    rOPINIRole (REQUIP) 1 MG tablet Take 1 tablet by mouth every evening 30 tablet 1    FLUoxetine (PROZAC) 20 MG capsule Take 1 capsule by mouth daily      busPIRone (BUSPAR) 7.5 MG tablet Take 1 tablet by mouth 2 times daily 90 tablet 0    famotidine (PEPCID) 20 MG tablet Take 1 tablet by mouth 2 times daily 60 tablet 3    albuterol sulfate HFA (VENTOLIN HFA) 108 (90 Base) MCG/ACT inhaler Inhale 2 puffs into the lungs every 6 hours as needed for Wheezing 1 Inhaler 3     No current facility-administered medications for this visit. ALLERGIES:    No Known Allergies    SOCIAL HISTORY   Reviewed and no change from previous record. Autumn Garcia  reports that he has been smoking cigarettes. He has a 6.00 pack-year smoking history.  He has never used smokeless tobacco.    FAMILY HISTORY:    Reviewed and No change from previous visit    REVIEW OF SYSTEMS:    Review of Systems   Constitutional: Negative for appetite change, chills, diaphoresis, fatigue, fever and unexpected weight change. HENT: Positive for postnasal drip, rhinorrhea and sore throat. Negative for congestion, ear pain, sinus pain and trouble swallowing. Eyes: Negative for visual disturbance. Respiratory: Positive for cough, chest tightness and shortness of breath. Negative for wheezing. Cardiovascular: Negative for chest pain, palpitations and leg swelling. Gastrointestinal: Positive for abdominal pain. Negative for diarrhea, nausea and vomiting. Musculoskeletal: Negative for arthralgias and myalgias. Skin: Negative for rash. Neurological: Negative for dizziness, syncope and weakness. All other systems reviewed and are negative. PHYSICAL EXAM:    There were no vitals filed for this visit. Exam was limited due to virtual encounter. General - alert, well appearing, non toxic, in no distress  Skin - normal coloration and turgor, no rash  Eyes - sclera appear clear, no conjunctival injection, no discharge  Ears - no pain with manipulation of tragus or auricle, no drainage, no mastoid tenderness, hearing normal   Nose - normal and patent, no erythema, discharge  Mouth - mucous membranes are moist  Neck - supple, no masses appreciated  Chest - respirations are unlabored, no tachypnea or signs or respiratory distress  Abdomen - soft, nontender, nondistended  Neurological - alert, oriented x 3, normal speech  Extremities - no pedal edema    ASSESSMENT AND PLAN:      1. Viral URI with cough  - budesonide-formoterol (SYMBICORT) 160-4.5 MCG/ACT AERO; Inhale 2 puffs into the lungs 2 times daily  Dispense: 30.6 g; Refill: 1  - methylPREDNISolone (MEDROL, ANNIE,) 4 MG tablet; Take as directed  Dispense: 1 kit; Refill: 0  - guaiFENesin (MUCINEX) 600 MG extended release tablet;  Take 1 tablet by mouth 2 times daily for 15 days  Dispense: 30 tablet; Refill: 0      FOLLOW UP AND INSTRUCTIONS:   No follow-ups on file. Discussed use, benefit, and side effects of prescribed medications. All patient questions answered. Patient voiced understanding. Patient given educational materials - see patient instructions    Patient being evaluated by a Virtual Visit (video visit) encounter to address concerns as mentioned above. A caregiver was present when appropriate. Due to this being a TeleHealth encounter ([facility] emergency), evaluation of the following organ systems was limited: Vitals/Constitutional/EENT/Resp/CV/GI//MS/Neuro/Skin/Heme-Lymph-Imm. Pursuant to the emergency declaration under the [address] [place] [address], [address] [place] [address] authority and the [employer] Act, this Virtual Visit was conducted with patient's (and/or legal guardian's) consent, to reduce the patient's risk of exposure to COVID-19 and provide necessary medical care. The patient (and/or legal guardian) has also been advised to contact this office for worsening conditions or problems, and seek emergency medical treatment and/or call 911 if deemed necessary. Services were provided through a video synchronous discussion virtually to substitute for in-person clinic visit. Patient and provider were located at their individual homes. MADISON Smallwood - PRASHANT   [facility]  11/1/2021, 12:34 PM    Please note that this chart wasgenerated using voice recognition Dragon dictation software. Although every effort was made to ensure the accuracy of this automated transcription, some errors in transcription may have occurred.

## 2022-02-21 ENCOUNTER — HOSPITAL ENCOUNTER (EMERGENCY)
Age: 27
Discharge: HOME | End: 2022-02-22
Attending: EMERGENCY MEDICINE

## 2022-02-21 DIAGNOSIS — L02.91 ABSCESS: Primary | ICD-10-CM

## 2022-02-21 PROCEDURE — 99285 EMERGENCY DEPT VISIT HI MDM: CPT

## 2022-02-21 ASSESSMENT — PAIN DESCRIPTION - PAIN TYPE: TYPE: ACUTE PAIN

## 2022-02-21 ASSESSMENT — PAIN DESCRIPTION - ORIENTATION: ORIENTATION: RIGHT

## 2022-02-21 ASSESSMENT — PAIN SCALES - GENERAL: PAINLEVEL_OUTOF10: 8

## 2022-02-21 ASSESSMENT — PAIN - FUNCTIONAL ASSESSMENT: PAIN_FUNCTIONAL_ASSESSMENT: 0-10

## 2022-02-22 VITALS
HEIGHT: 70 IN | TEMPERATURE: 98.9 F | BODY MASS INDEX: 23.62 KG/M2 | OXYGEN SATURATION: 98 % | WEIGHT: 165 LBS | SYSTOLIC BLOOD PRESSURE: 122 MMHG | HEART RATE: 80 BPM | DIASTOLIC BLOOD PRESSURE: 81 MMHG | RESPIRATION RATE: 18 BRPM

## 2022-02-22 PROCEDURE — 6370000000 HC RX 637 (ALT 250 FOR IP): Performed by: STUDENT IN AN ORGANIZED HEALTH CARE EDUCATION/TRAINING PROGRAM

## 2022-02-22 RX ORDER — DOXYCYCLINE HYCLATE 100 MG
100 TABLET ORAL 2 TIMES DAILY
Qty: 14 TABLET | Refills: 0 | Status: SHIPPED | OUTPATIENT
Start: 2022-02-22 | End: 2022-03-01

## 2022-02-22 RX ORDER — IBUPROFEN 600 MG/1
600 TABLET ORAL ONCE
Status: COMPLETED | OUTPATIENT
Start: 2022-02-22 | End: 2022-02-22

## 2022-02-22 RX ORDER — DOXYCYCLINE 100 MG/1
100 CAPSULE ORAL ONCE
Status: COMPLETED | OUTPATIENT
Start: 2022-02-22 | End: 2022-02-22

## 2022-02-22 RX ORDER — IBUPROFEN 200 MG
600 TABLET ORAL EVERY 8 HOURS PRN
Qty: 30 TABLET | Refills: 0 | Status: SHIPPED | OUTPATIENT
Start: 2022-02-22

## 2022-02-22 RX ADMIN — DOXYCYCLINE 100 MG: 100 CAPSULE ORAL at 00:35

## 2022-02-22 RX ADMIN — IBUPROFEN 600 MG: 600 TABLET ORAL at 00:35

## 2022-02-22 ASSESSMENT — ENCOUNTER SYMPTOMS
COUGH: 0
SHORTNESS OF BREATH: 0
BACK PAIN: 0
VOMITING: 0
ABDOMINAL PAIN: 0

## 2022-02-22 ASSESSMENT — PAIN SCALES - GENERAL
PAINLEVEL_OUTOF10: 5
PAINLEVEL_OUTOF10: 3

## 2022-02-22 NOTE — ED PROVIDER NOTES
EMERGENCY DEPARTMENT ENCOUNTER   ATTENDING ATTESTATION     Pt Name: Geraldine Leyva III  MRN: [id number]  [place] 1995  Date of evaluation: 2/22/22       Geraldine Leyva III is a 27 y.o. male who presents with Abscess      MDM:   This is a 78-year-old male who comes in today with redness in his right axilla suspect cellulitis there is no fluctuance do not believe the patient has an abscess at this time not septic appears clinically well will treat with doxycycline    Vitals:   Vitals:    02/21/22 2320   BP: 125/85   Pulse: 84   Resp: 18   Temp: 99.2 °F (37.3 °C)   TempSrc: Oral   SpO2: 99%   Weight: 165 lb (74.8 kg)   Height: 5' 10\" (1.778 m)         I personally saw and examined the patient. I have reviewed and agree with the resident's findings, including all diagnostic interpretations and treatment plan as written. I was present for the key portions of any procedures performed and the inclusive time noted for any critical care statement. The care is provided during an unprecedented national emergency due to the novel coronavirus, COVID 19.   Kathye Epley, MD  Attending Emergency Physician           Kathye Epley, MD  02/22/22 7053

## 2022-02-22 NOTE — ED PROVIDER NOTES
[address] ED  Emergency Department Encounter  Emergency Medicine Resident     Pt Name: Digna Syed III  MRN: [id number]  [place] 1995  Date of evaluation: 2/21/22  PCP:  MADISON Carr CNP    CHIEF COMPLAINT       Chief Complaint   Patient presents with    Abscess       HISTORY Benjamín  (Location/Symptom, Timing/Onset, Context/Setting, Quality, Duration, Modifying Factors,Severity.)      Digna Syed III is a 27 y. o.yo male who presents with complaints of abscess over his right armpit, he reports that symptoms started about 2 days ago and has progressively enlarging. He also noticed that erythema is slowly going towards his triceps muscle. He states that it is very painful, he reports that he put pressure on it without any drainage of pus. He reports he has not taken anything for the pain. Patient denies any systemic symptoms of fever, chills, weight loss, night sweats. Patient reports he has never had any abscess like this before or abscess in other areas of his body. PAST MEDICAL / SURGICAL / SOCIAL / FAMILY HISTORY      has a past medical history of Anxiety, Asthma, and Depression. has no past surgical history on file.      Social History     Socioeconomic History    Marital status: Single     Spouse name: Not on file    Number of children: Not on file    Years of education: Not on file    Highest education level: Not on file   Occupational History    Not on file   Tobacco Use    Smoking status: Current Every Day Smoker     Packs/day: 1.50     Years: 4.00     Pack years: 6.00     Types: Cigarettes     Last attempt to quit: 5/1/2018     Years since quitting: 3.8    Smokeless tobacco: Never Used    Tobacco comment: pt currently using vapor   Substance and Sexual Activity    Alcohol use: Yes     Comment: social ETOH use    Drug use: Yes     Types: Marijuana (Weed)     Comment: drug abuse includes cannabis    Sexual activity: Not on file   Other Topics Concern    Not on file   Social History Narrative    Not on file     Social Determinants of Health     Financial Resource Strain: Low Risk     Difficulty of Paying Living Expenses: Not hard at all   Food Insecurity: No Food Insecurity    Worried About Running Out of Food in the Last Year: Never true    [address] in the Last Year: Never true   Transportation Needs:     Lack of Transportation (Medical): Not on file    Lack of Transportation (Non-Medical): Not on file   Physical Activity:     Days of Exercise per Week: Not on file    Minutes of Exercise per Session: Not on file   Stress:     Feeling of Stress : Not on file   Social Connections:     Frequency of Communication with Friends and Family: Not on file    Frequency of Social Gatherings with Friends and Family: Not on file    Attends Jehovah's witness Services: Not on file    Active Member of [address] [place] [address] or Organizations: Not on file    Attends Club or Organization Meetings: Not on file    Marital Status: Not on file   Intimate Partner Violence:     Fear of Current or Ex-Partner: Not on file    Emotionally Abused: Not on file    Physically Abused: Not on file    Sexually Abused: Not on file   Housing Stability:     Unable to Pay for Housing in the Last Year: Not on file    Number of [place] in the Last Year: Not on file    Unstable Housing in the Last Year: Not on file       History reviewed. No pertinent family history. Allergies:  Patient has no known allergies. Home Medications:  Prior to Admission medications    Medication Sig Start Date End Date Taking?  Authorizing Provider   doxycycline hyclate (VIBRA-TABS) 100 MG tablet Take 1 tablet by mouth 2 times daily for 7 days 2/22/22 3/1/22 Yes Mary Magana MD   ibuprofen (ADVIL;MOTRIN) 200 MG tablet Take 3 tablets by mouth every 8 hours as needed for Pain or Fever 2/22/22  Yes David Farmer MD   COVID-19 Specimen Collection KIT TEST AS DIRECTED 10/3/21   Historical Provider, MD   hydrOXYzine (VISTARIL) 50 MG capsule TAKE 1 CAPSULE BY MOUTH THREE TIMES A DAY AS NEEDED 7/21/21   Historical Provider, MD   budesonide-formoterol (SYMBICORT) 160-4.5 MCG/ACT AERO Inhale 2 puffs into the lungs 2 times daily 10/21/21   MADISON Jackman CNP   fluticasone (FLONASE) 50 MCG/ACT nasal spray 1 spray by Each Nostril route daily 7/23/21   Daniel Lozano MD   rOPINIRole (REQUIP) 1 MG tablet Take 1 tablet by mouth every evening 7/23/21   Daniel Lozano MD   FLUoxetine (PROZAC) 20 MG capsule Take 1 capsule by mouth daily 3/16/21   Historical Provider, MD   busPIRone (BUSPAR) 7.5 MG tablet Take 1 tablet by mouth 2 times daily 3/12/21   MADISON Jackman CNP   famotidine (PEPCID) 20 MG tablet Take 1 tablet by mouth 2 times daily 3/12/21   MADISON Jackman CNP   albuterol sulfate HFA (VENTOLIN HFA) 108 (90 Base) MCG/ACT inhaler Inhale 2 puffs into the lungs every 6 hours as needed for Wheezing 3/22/20   Sue Smalls MD       REVIEW OFSYSTEMS    (2-9 systems for level 4, 10 or more for level 5)      Review of Systems   Constitutional: Negative for fatigue and fever. Respiratory: Negative for cough and shortness of breath. Cardiovascular: Negative for chest pain and leg swelling. Gastrointestinal: Negative for abdominal pain and vomiting. Musculoskeletal: Negative for back pain and gait problem. Skin: Positive for wound. Neurological: Negative for facial asymmetry and headaches. Psychiatric/Behavioral: Negative for behavioral problems and confusion. PHYSICAL EXAM   (up to 7 for level 4, 8 or more forlevel 5)      INITIAL VITALS:   ED Triage Vitals [02/21/22 2320]   BP Temp Temp Source Pulse Resp SpO2 Height Weight   125/85 99.2 °F (37.3 °C) Oral 84 18 99 % 5' 10\" (1.778 m) 165 lb (74.8 kg)       Physical Exam  Constitutional:       Appearance: Normal appearance. HENT:      Head: Normocephalic and atraumatic.       Mouth/Throat:      Mouth: Mucous membranes are moist.   Eyes:      Pupils: Pupils are equal, round, and reactive to light. Pulmonary:      Effort: Pulmonary effort is normal. No respiratory distress. Abdominal:      General: There is no distension. Tenderness: There is no abdominal tenderness. Musculoskeletal:         General: No swelling or tenderness. Cervical back: No rigidity or tenderness. Skin:     Capillary Refill: Capillary refill takes less than 2 seconds. Findings: Erythema and lesion present. Comments: Signs of 2 x 3 cm swelling/presents over his right arm.,  Patient does have what appears to be erythema and warmth that is spreading to his triceps muscle   Neurological:      General: No focal deficit present. Mental Status: He is alert. Psychiatric:         Mood and Affect: Mood normal.         Behavior: Behavior normal.         DIFFERENTIAL  DIAGNOSIS     PLAN (LABS / IMAGING / EKG):  No orders of the defined types were placed in this encounter. MEDICATIONS ORDERED:  Orders Placed This Encounter   Medications    doxycycline monohydrate (MONODOX) capsule 100 mg     Order Specific Question:   Antimicrobial Indications     Answer:   Skin and Soft Tissue Infection    ibuprofen (ADVIL;MOTRIN) tablet 600 mg    doxycycline hyclate (VIBRA-TABS) 100 MG tablet     Sig: Take 1 tablet by mouth 2 times daily for 7 days     Dispense:  14 tablet     Refill:  0    ibuprofen (ADVIL;MOTRIN) 200 MG tablet     Sig: Take 3 tablets by mouth every 8 hours as needed for Pain or Fever     Dispense:  30 tablet     Refill:  0       Initial MDM/Plan: 27 y.o. male who presents with complaints of abscess over his right armpit. Area does appear warm, erythematous, there is no area of fluctuance that could be drained initially.   Impression is supportive adenitis with superimposed cellulitis  We will treat with doxycycline   Patient to follow-up with general surgery      DIAGNOSTIC RESULTS / EMERGENCYDEPARTMENT COURSE / MDM LABS:  Labs Reviewed - No data to display      RADIOLOGY:  No results found. EKG      All EKG's are interpreted by the Emergency Department Physicianwho either signs or Co-signs this chart in the absence of a cardiologist.    EMERGENCY DEPARTMENT COURSE:          PROCEDURES:  None    CONSULTS:  None    CRITICAL CARE:      FINAL IMPRESSION      1.  Abscess          DISPOSITION / PLAN     DISPOSITION Decision To Discharge 02/22/2022 12:07:15 AM      PATIENT REFERRED TO:  [facility] ED  [facility] [address] [facility] [address]  [phone]    If symptoms worsen    Jarret De La Paz, APRN - CNP  [address] [facility]  [phone]      As needed    Liam Mcintosh MD  [facility] [address] [phone]      Plan of supportive adenitis      DISCHARGE MEDICATIONS:  New Prescriptions    DOXYCYCLINE HYCLATE (VIBRA-TABS) 100 MG TABLET    Take 1 tablet by mouth 2 times daily for 7 days    IBUPROFEN (ADVIL;MOTRIN) 200 MG TABLET    Take 3 tablets by mouth every 8 hours as needed for Pain or Fever       Mark Anthony Chowdary MD  Emergency Medicine Resident    (Please note that portions of this note were completed with a voice recognition program.Efforts were made to edit the dictations but occasionally words are mis-transcribed.)        Mark Anthony Chowdary MD  Resident  02/22/22 0030

## 2022-02-22 NOTE — ED TRIAGE NOTES
Mode of arrival (squad #, walk in, police, etc) : walk in        Chief complaint(s): lump in RT armpit        Arrival Note (brief scenario, treatment PTA, etc). : Noticed xs 2 days ago a bump in his armpit and thought it was a bug bite. Pt has red marks down arm as well. C= \"Have you ever felt that you should Cut down on your drinking? \" no  A= \"Have people Annoyed you by criticizing your drinking? \" no  G= \"Have you ever felt bad or Guilty about your drinking? \"no  E= \"Have you ever had a drink as an Eye-opener first thing in the morning to steady your nerves or to help a hangover? \" no    Deferred []      Reason for deferring: nA    *If yes to two or more: probable alcohol abuse. *